# Patient Record
Sex: MALE | Race: BLACK OR AFRICAN AMERICAN | NOT HISPANIC OR LATINO | Employment: PART TIME | URBAN - METROPOLITAN AREA
[De-identification: names, ages, dates, MRNs, and addresses within clinical notes are randomized per-mention and may not be internally consistent; named-entity substitution may affect disease eponyms.]

---

## 2021-05-28 ENCOUNTER — RECORDS - HEALTHEAST (OUTPATIENT)
Dept: ADMINISTRATIVE | Facility: CLINIC | Age: 61
End: 2021-05-28

## 2021-05-31 ENCOUNTER — RECORDS - HEALTHEAST (OUTPATIENT)
Dept: ADMINISTRATIVE | Facility: CLINIC | Age: 61
End: 2021-05-31

## 2021-06-01 ENCOUNTER — RECORDS - HEALTHEAST (OUTPATIENT)
Dept: ADMINISTRATIVE | Facility: CLINIC | Age: 61
End: 2021-06-01

## 2021-11-30 ENCOUNTER — HOSPITAL ENCOUNTER (EMERGENCY)
Facility: CLINIC | Age: 61
Discharge: HOME | End: 2021-11-30
Attending: EMERGENCY MEDICINE | Admitting: EMERGENCY MEDICINE

## 2021-11-30 ENCOUNTER — APPOINTMENT (OUTPATIENT)
Dept: CT IMAGING | Facility: CLINIC | Age: 61
End: 2021-11-30
Attending: EMERGENCY MEDICINE

## 2021-11-30 VITALS
HEIGHT: 69 IN | WEIGHT: 188 LBS | SYSTOLIC BLOOD PRESSURE: 139 MMHG | HEART RATE: 60 BPM | RESPIRATION RATE: 20 BRPM | BODY MASS INDEX: 27.85 KG/M2 | TEMPERATURE: 98 F | OXYGEN SATURATION: 98 % | DIASTOLIC BLOOD PRESSURE: 74 MMHG

## 2021-11-30 DIAGNOSIS — M79.10 MUSCLE PAIN: ICD-10-CM

## 2021-11-30 DIAGNOSIS — R10.9 ACUTE RIGHT FLANK PAIN: ICD-10-CM

## 2021-11-30 LAB
ALBUMIN SERPL-MCNC: 3.1 G/DL (ref 3.5–5)
ALBUMIN UR-MCNC: 10 MG/DL
ALP SERPL-CCNC: 41 U/L (ref 45–120)
ALT SERPL W P-5'-P-CCNC: 38 U/L (ref 0–45)
ANION GAP SERPL CALCULATED.3IONS-SCNC: 6 MMOL/L (ref 5–18)
APPEARANCE UR: CLEAR
AST SERPL W P-5'-P-CCNC: 36 U/L (ref 0–40)
BASOPHILS # BLD AUTO: 0.1 10E3/UL (ref 0–0.2)
BASOPHILS NFR BLD AUTO: 1 %
BILIRUB SERPL-MCNC: 0.7 MG/DL (ref 0–1)
BILIRUB UR QL STRIP: NEGATIVE
BUN SERPL-MCNC: 14 MG/DL (ref 8–22)
CALCIUM SERPL-MCNC: 8.4 MG/DL (ref 8.5–10.5)
CHLORIDE BLD-SCNC: 110 MMOL/L (ref 98–107)
CO2 SERPL-SCNC: 22 MMOL/L (ref 22–31)
COLOR UR AUTO: YELLOW
CREAT SERPL-MCNC: 1.16 MG/DL (ref 0.7–1.3)
EOSINOPHIL # BLD AUTO: 0.1 10E3/UL (ref 0–0.7)
EOSINOPHIL NFR BLD AUTO: 3 %
ERYTHROCYTE [DISTWIDTH] IN BLOOD BY AUTOMATED COUNT: 12.2 % (ref 10–15)
GFR SERPL CREATININE-BSD FRML MDRD: 67 ML/MIN/1.73M2
GLUCOSE BLD-MCNC: 100 MG/DL (ref 70–125)
GLUCOSE UR STRIP-MCNC: NEGATIVE MG/DL
HCT VFR BLD AUTO: 40.1 % (ref 40–53)
HGB BLD-MCNC: 13.8 G/DL (ref 13.3–17.7)
HGB UR QL STRIP: NEGATIVE
IMM GRANULOCYTES # BLD: 0 10E3/UL
IMM GRANULOCYTES NFR BLD: 0 %
INR PPP: 1.9 (ref 0.85–1.15)
KETONES UR STRIP-MCNC: NEGATIVE MG/DL
LEUKOCYTE ESTERASE UR QL STRIP: NEGATIVE
LYMPHOCYTES # BLD AUTO: 1.6 10E3/UL (ref 0.8–5.3)
LYMPHOCYTES NFR BLD AUTO: 43 %
MCH RBC QN AUTO: 32.2 PG (ref 26.5–33)
MCHC RBC AUTO-ENTMCNC: 34.4 G/DL (ref 31.5–36.5)
MCV RBC AUTO: 94 FL (ref 78–100)
MONOCYTES # BLD AUTO: 0.6 10E3/UL (ref 0–1.3)
MONOCYTES NFR BLD AUTO: 16 %
MUCOUS THREADS #/AREA URNS LPF: PRESENT /LPF
NEUTROPHILS # BLD AUTO: 1.4 10E3/UL (ref 1.6–8.3)
NEUTROPHILS NFR BLD AUTO: 37 %
NITRATE UR QL: NEGATIVE
NRBC # BLD AUTO: 0 10E3/UL
NRBC BLD AUTO-RTO: 0 /100
PH UR STRIP: 5.5 [PH] (ref 5–7)
PLATELET # BLD AUTO: 133 10E3/UL (ref 150–450)
POTASSIUM BLD-SCNC: 4 MMOL/L (ref 3.5–5)
PROT SERPL-MCNC: 6.4 G/DL (ref 6–8)
RBC # BLD AUTO: 4.29 10E6/UL (ref 4.4–5.9)
RBC URINE: <1 /HPF
SODIUM SERPL-SCNC: 138 MMOL/L (ref 136–145)
SP GR UR STRIP: 1.03 (ref 1–1.03)
UROBILINOGEN UR STRIP-MCNC: <2 MG/DL
WBC # BLD AUTO: 3.7 10E3/UL (ref 4–11)
WBC URINE: 1 /HPF

## 2021-11-30 PROCEDURE — 85610 PROTHROMBIN TIME: CPT | Performed by: EMERGENCY MEDICINE

## 2021-11-30 PROCEDURE — 81001 URINALYSIS AUTO W/SCOPE: CPT | Performed by: EMERGENCY MEDICINE

## 2021-11-30 PROCEDURE — 250N000013 HC RX MED GY IP 250 OP 250 PS 637: Performed by: EMERGENCY MEDICINE

## 2021-11-30 PROCEDURE — 36415 COLL VENOUS BLD VENIPUNCTURE: CPT | Performed by: EMERGENCY MEDICINE

## 2021-11-30 PROCEDURE — 80053 COMPREHEN METABOLIC PANEL: CPT | Performed by: EMERGENCY MEDICINE

## 2021-11-30 PROCEDURE — 99284 EMERGENCY DEPT VISIT MOD MDM: CPT | Mod: 25

## 2021-11-30 PROCEDURE — 85004 AUTOMATED DIFF WBC COUNT: CPT | Performed by: EMERGENCY MEDICINE

## 2021-11-30 PROCEDURE — 74176 CT ABD & PELVIS W/O CONTRAST: CPT

## 2021-11-30 RX ORDER — CYCLOBENZAPRINE HCL 10 MG
10 TABLET ORAL ONCE
Status: COMPLETED | OUTPATIENT
Start: 2021-11-30 | End: 2021-11-30

## 2021-11-30 RX ORDER — CYCLOBENZAPRINE HCL 10 MG
10 TABLET ORAL 3 TIMES DAILY PRN
Qty: 20 TABLET | Refills: 0 | Status: SHIPPED | OUTPATIENT
Start: 2021-11-30 | End: 2021-12-06

## 2021-11-30 RX ORDER — ACETAMINOPHEN 325 MG/1
975 TABLET ORAL ONCE
Status: COMPLETED | OUTPATIENT
Start: 2021-11-30 | End: 2021-11-30

## 2021-11-30 RX ADMIN — CYCLOBENZAPRINE 10 MG: 10 TABLET, FILM COATED ORAL at 13:55

## 2021-11-30 RX ADMIN — ACETAMINOPHEN 975 MG: 325 TABLET ORAL at 12:20

## 2021-11-30 ASSESSMENT — MIFFLIN-ST. JEOR: SCORE: 1643.14

## 2021-11-30 ASSESSMENT — ENCOUNTER SYMPTOMS
DYSURIA: 0
NAUSEA: 0
FLANK PAIN: 1
COUGH: 0

## 2021-11-30 NOTE — DISCHARGE INSTRUCTIONS
Follow up with your clinic in a few days for ongoing pain to discuss next step in evaluating and treating the cause of that muscular pain.  In the meantime, work on keeping better hydrated.    Use over the counter muscle pain patches that have numbing medicine in them.  Then, use either acetaminophen for pain and the muscle spasm medication as needed for uncontrolled spasms.  Do gentle stretching and walking.    Follow up with your inr clinic.  Your inr today was 1.9 which is likely just below your goal level.

## 2021-11-30 NOTE — ED TRIAGE NOTES
3 day history of right sided flank pain.  Pain is worse with movement and goes away when he sits still.  No known back injury.  Denies dysuria or hematuria.

## 2021-11-30 NOTE — Clinical Note
Robina Ferguson was seen and treated in our emergency department on 11/30/2021.  He may return to work on 12/02/2021.       If you have any questions or concerns, please don't hesitate to call.      Alpa Guzman MD

## 2021-11-30 NOTE — ED PROVIDER NOTES
Emergency Department Encounter     Evaluation Date & Time:   No admission date for patient encounter.    CHIEF COMPLAINT:  Flank Pain      Triage Note:3 day history of right sided flank pain.  Pain is worse with movement and goes away when he sits still.  No known back injury.  Denies dysuria or hematuria.        Impression and Plan     ED COURSE & MEDICAL DECISION MAKIN:05 AM I met with the patient, obtained history, performed an initial exam, and discussed options and plan for diagnostics and treatment here in the ED.   1:29 PM Checked in on and updated patient. I discussed the plan for discharge with the patient, and patient is agreeable.  We discussed supportive cares at home and reasons for return to the ER including new or worsening symptoms - all questions and concerns addressed.  Patient to be discharged by RN.     ED Course as of 21 1340   Tue 2021   1112 Patient's pain seems very much muscular and less likely kidney stone as its made worsened by movement.  I do not really elicit the same level of pain with palpating it but certainly when he flexes forward he does elicit the pain.  Given that he is on warfarin this may be indicative of psoas muscle hemorrhage so will do CT imaging of that area.  I think we can start with unenhanced as I am simply concerned with whether or not there is hemorrhage or fluid whether it is continuing to bleed we could do repeat imaging with contrast if needed.  Patient is comfortable with the plan.  Blood work to check his INR level.  Less likely this is kidney or liver inflammation given the nature of his pain worsened with movement.  I am not able to ski his skin very well out there because he is fully dressed in multiple layers of clothing but really is not sensitive to light touch regardless so less likely to be something in his skin.   1329 Imaging is unremarkable for cause.  He is very slightly dehydrated.  INR is 1.9 so he can follow-up with his  INR clinic on that.  He does feel comfortable going home with treatment for muscle spasm.  He will follow-up with his primary care physician in a few days if he is not improved to discuss further evaluation or therapeutic options       At the conclusion of the encounter I discussed the results of all the tests and the disposition. The questions were answered. The patient or family acknowledged understanding and was agreeable with the care plan.        FINAL IMPRESSION:    ICD-10-CM    1. Muscle pain  M79.10    2. Acute right flank pain  R10.9        0 minutes of critical care time        MEDICATIONS GIVEN IN THE EMERGENCY DEPARTMENT:  Medications   cyclobenzaprine (FLEXERIL) tablet 10 mg (has no administration in time range)   acetaminophen (TYLENOL) tablet 975 mg (975 mg Oral Given 11/30/21 1220)       NEW PRESCRIPTIONS STARTED AT TODAY'S ED VISIT:  New Prescriptions    CYCLOBENZAPRINE (FLEXERIL) 10 MG TABLET    Take 1 tablet (10 mg) by mouth 3 times daily as needed for muscle spasms       HPI     The history is provided by the patient. No  was used.        Robina Ferguson is a 60 year old male with a pertinent history of chronic low back pain and DVT who presents to this ED by walk in for evaluation of right flank pain.     Over the weekend (~2-3 days ago), patient began to feel sharp right flank pain which he describes as kidney pain. Report that it worsens when moving certain ways and there is so pain when applying pressure. Notes that he was barely able to put pants on this morning because bending over caused too much pain. Patient denies acute trauma to his back, but endorse that he spent the weekend in his recliner and that he might have bruised his back.     Yesterday, patient was at work and a teacher gave him a painkiller which did not relieve symptoms. Patient denies having this problem before. Reports history of chronic lower back pain due to a pinched nerve, however, endorses that  "this pain has not been bothering him lately. Patient reports that his normal clinic is [facility]. Patient is on Warfarin and Coumadin for history of DVT. Denies nausea, dysuria, cough, cold, or any other complaints at this time.       REVIEW OF SYSTEMS:  Review of Systems   Respiratory: Negative for cough.    Gastrointestinal: Negative for nausea.   Genitourinary: Positive for flank pain (right). Negative for dysuria.     remainder of systems are all otherwise negative.        Medical History     Past Medical History:   Diagnosis Date     DVT (deep venous thrombosis) (H)        No past surgical history on file.    No family history on file.    Social History     Tobacco Use     Smoking status: Former Smoker     Smokeless tobacco: Not on file     Tobacco comment: Quit at age 26 years.   Substance Use Topics     Alcohol use: Not on file     Drug use: Not on file       cyclobenzaprine (FLEXERIL) 10 MG tablet  warfarin (COUMADIN) 5 MG tablet        Physical Exam     First Vitals:  Patient Vitals for the past 24 hrs:   BP Temp Temp src Pulse Resp SpO2 Height Weight   11/30/21 1300 124/83 -- -- 51 -- 97 % -- --   11/30/21 1230 139/88 -- -- 52 -- 96 % -- --   11/30/21 0907 (!) 123/92 98  F (36.7  C) Oral 62 16 96 % 1.753 m (5' 9\") 85.3 kg (188 lb)       PHYSICAL EXAM:   Constitutional:  Sitting in a wheelchair and conversing normally    HENT:  Normocephalic, wearing a mask and normal voice.   Eyes:  PERRL, EOMI, Conjunctiva normal, No discharge, no scleral icterus.  Respiratory:  Breathing easily, cta  Cardiovascular:  RRR nl s1s2 0 murmurs, rubs, or gallops.  Peripheral pulses dp, pt, and radial are wnl.     GI:  Bowel sounds normal, Soft, No tenderness, No flank tenderness, nondistended.  :No CVA tenderness.   Musculoskeletal:  Moves all extremities.  No erythematous or swollen major joints, mild ttp over r lower posterior thoracic area, but moreso localizes pain there with rom of his back.  Full " rom.  Integument:  Normal color and not diaphoretic.   Lymphatic:  No cervical lymphadenopathy  Neurologic:  Alert & oriented x 3, Normal motor function, Normal sensory function, No focal deficits noted. Normal speech.  Psychiatric:  Affect normal, Judgment normal, Mood normal.     Results     LAB:  All pertinent labs reviewed and interpreted  Results for orders placed or performed during the hospital encounter of 11/30/21   Abd/pelvis CT no contrast - Stone Protocol     Status: None    Narrative    EXAM: CT ABDOMEN PELVIS W/O CONTRAST  LOCATION: [facility]  DATE/TIME: 11/30/2021 11:19 AM    INDICATION: Right flank pain.  COMPARISON: 01/21/2010.  TECHNIQUE: CT scan of the abdomen and pelvis was performed without IV contrast. Multiplanar reformats were obtained. Dose reduction techniques were used.  CONTRAST: None.    FINDINGS:   LOWER CHEST: Normal.    HEPATOBILIARY: Normal.    PANCREAS: Normal.    SPLEEN: Normal.    ADRENAL GLANDS: Normal.    KIDNEYS/BLADDER: No hydronephrosis or renal or ureteral stone on either side. No abnormal renal masses. No bladder stone or mass.    BOWEL: No bowel obstruction. Normal appendix no free intraperitoneal air. No evidence for diverticulitis or abscess.    LYMPH NODES: Normal.    VASCULATURE: Mild aortoiliac calcification without aneurysm.    PELVIC ORGANS: Central prostatic calcification.    MUSCULOSKELETAL: No evidence for intramuscular hematoma in the abdomen or pelvis. Small fat-containing umbilical hernia without evidence for strangulation. Mild degenerative change in the upper lumbar spine no acute fracture or suspicious bony lesion.      Impression    IMPRESSION:   1.  No evidence for hydronephrosis or renal or ureteral stone. No abnormal renal masses.  2.  No muscular hematoma.  3.  No bowel obstruction, appendicitis, diverticulitis or abscess.  4.  Small umbilical hernia containing only fat.     UA with Microscopic reflex to Culture     Status:  Abnormal    Specimen: Urine, Clean Catch   Result Value Ref Range    Color Urine Yellow Colorless, Straw, Light Yellow, Yellow    Appearance Urine Clear Clear    Glucose Urine Negative Negative mg/dL    Bilirubin Urine Negative Negative    Ketones Urine Negative Negative mg/dL    Specific Gravity Urine 1.028 1.001 - 1.030    Blood Urine Negative Negative    pH Urine 5.5 5.0 - 7.0    Protein Albumin Urine 10  (A) Negative mg/dL    Urobilinogen Urine <2.0 <2.0 mg/dL    Nitrite Urine Negative Negative    Leukocyte Esterase Urine Negative Negative    Mucus Urine Present (A) None Seen /LPF    RBC Urine <1 <=2 /HPF    WBC Urine 1 <=5 /HPF    Narrative    Urine Culture not indicated   INR     Status: Abnormal   Result Value Ref Range    INR 1.90 (H) 0.85 - 1.15   Comprehensive metabolic panel     Status: Abnormal   Result Value Ref Range    Sodium 138 136 - 145 mmol/L    Potassium 4.0 3.5 - 5.0 mmol/L    Chloride 110 (H) 98 - 107 mmol/L    Carbon Dioxide (CO2) 22 22 - 31 mmol/L    Anion Gap 6 5 - 18 mmol/L    Urea Nitrogen 14 8 - 22 mg/dL    Creatinine 1.16 0.70 - 1.30 mg/dL    Calcium 8.4 (L) 8.5 - 10.5 mg/dL    Glucose 100 70 - 125 mg/dL    Alkaline Phosphatase 41 (L) 45 - 120 U/L    AST 36 0 - 40 U/L    ALT 38 0 - 45 U/L    Protein Total 6.4 6.0 - 8.0 g/dL    Albumin 3.1 (L) 3.5 - 5.0 g/dL    Bilirubin Total 0.7 0.0 - 1.0 mg/dL    GFR Estimate 67 >60 mL/min/1.73m2   CBC with platelets and differential     Status: Abnormal   Result Value Ref Range    WBC Count 3.7 (L) 4.0 - 11.0 10e3/uL    RBC Count 4.29 (L) 4.40 - 5.90 10e6/uL    Hemoglobin 13.8 13.3 - 17.7 g/dL    Hematocrit 40.1 40.0 - 53.0 %    MCV 94 78 - 100 fL    MCH 32.2 26.5 - 33.0 pg    MCHC 34.4 31.5 - 36.5 g/dL    RDW 12.2 10.0 - 15.0 %    Platelet Count 133 (L) 150 - 450 10e3/uL    % Neutrophils 37 %    % Lymphocytes 43 %    % Monocytes 16 %    % Eosinophils 3 %    % Basophils 1 %    % Immature Granulocytes 0 %    NRBCs per 100 WBC 0 <1 /100    Absolute  Neutrophils 1.4 (L) 1.6 - 8.3 10e3/uL    Absolute Lymphocytes 1.6 0.8 - 5.3 10e3/uL    Absolute Monocytes 0.6 0.0 - 1.3 10e3/uL    Absolute Eosinophils 0.1 0.0 - 0.7 10e3/uL    Absolute Basophils 0.1 0.0 - 0.2 10e3/uL    Absolute Immature Granulocytes 0.0 <=0.4 10e3/uL    Absolute NRBCs 0.0 10e3/uL   CBC with platelets differential     Status: Abnormal    Narrative    The following orders were created for panel order CBC with platelets differential.  Procedure                               Abnormality         Status                     ---------                               -----------         ------                     CBC with platelets and d...[[id number]]  Abnormal            Final result                 Please view results for these tests on the individual orders.       RADIOLOGY:  No results found.    PROCEDURES:  Procedures:      [facility] Documentation       CMS Diagnoses:         The creation of this record is based on the scribe s observations of the work being performed by Alpa Guzman MD, and the provider s statements to them. This document has been checked and approved by MD Alpa Velasquez MD  Emergency Medicine  [facility] EMERGENCY ROOM       Alpa Guzman MD  11/30/21 1537

## 2022-09-02 ENCOUNTER — APPOINTMENT (OUTPATIENT)
Dept: MRI IMAGING | Facility: HOSPITAL | Age: 62
End: 2022-09-02
Attending: EMERGENCY MEDICINE

## 2022-09-02 ENCOUNTER — HOSPITAL ENCOUNTER (EMERGENCY)
Facility: HOSPITAL | Age: 62
Discharge: HOME | End: 2022-09-03
Attending: EMERGENCY MEDICINE | Admitting: EMERGENCY MEDICINE

## 2022-09-02 ENCOUNTER — APPOINTMENT (OUTPATIENT)
Dept: RADIOLOGY | Facility: HOSPITAL | Age: 62
End: 2022-09-02
Attending: EMERGENCY MEDICINE

## 2022-09-02 DIAGNOSIS — Z86.73 CEREBRAL INFARCTION, CHRONIC: ICD-10-CM

## 2022-09-02 DIAGNOSIS — R42 LIGHT HEADEDNESS: ICD-10-CM

## 2022-09-02 DIAGNOSIS — D72.819 LEUKOPENIA, UNSPECIFIED TYPE: ICD-10-CM

## 2022-09-02 DIAGNOSIS — E04.1 THYROID NODULE: ICD-10-CM

## 2022-09-02 DIAGNOSIS — R79.89 ELEVATED SERUM CREATININE: ICD-10-CM

## 2022-09-02 LAB
ANION GAP SERPL CALCULATED.3IONS-SCNC: 9 MMOL/L (ref 5–18)
BUN SERPL-MCNC: 16 MG/DL (ref 8–22)
CALCIUM SERPL-MCNC: 8.3 MG/DL (ref 8.5–10.5)
CHLORIDE BLD-SCNC: 107 MMOL/L (ref 98–107)
CO2 SERPL-SCNC: 22 MMOL/L (ref 22–31)
CREAT SERPL-MCNC: 1.41 MG/DL (ref 0.7–1.3)
ERYTHROCYTE [DISTWIDTH] IN BLOOD BY AUTOMATED COUNT: 11.9 % (ref 10–15)
GFR SERPL CREATININE-BSD FRML MDRD: 56 ML/MIN/1.73M2
GLUCOSE BLD-MCNC: 164 MG/DL (ref 70–125)
HCT VFR BLD AUTO: 40.7 % (ref 40–53)
HGB BLD-MCNC: 14.7 G/DL (ref 13.3–17.7)
HOLD SPECIMEN: NORMAL
INR PPP: 2.08 (ref 0.85–1.15)
MCH RBC QN AUTO: 33.6 PG (ref 26.5–33)
MCHC RBC AUTO-ENTMCNC: 36.1 G/DL (ref 31.5–36.5)
MCV RBC AUTO: 93 FL (ref 78–100)
PLATELET # BLD AUTO: 150 10E3/UL (ref 150–450)
POTASSIUM BLD-SCNC: 3.5 MMOL/L (ref 3.5–5)
RBC # BLD AUTO: 4.38 10E6/UL (ref 4.4–5.9)
SODIUM SERPL-SCNC: 138 MMOL/L (ref 136–145)
TROPONIN I SERPL-MCNC: 0.01 NG/ML (ref 0–0.29)
WBC # BLD AUTO: 3.5 10E3/UL (ref 4–11)

## 2022-09-02 PROCEDURE — 36415 COLL VENOUS BLD VENIPUNCTURE: CPT | Performed by: EMERGENCY MEDICINE

## 2022-09-02 PROCEDURE — 85610 PROTHROMBIN TIME: CPT | Performed by: EMERGENCY MEDICINE

## 2022-09-02 PROCEDURE — 70553 MRI BRAIN STEM W/O & W/DYE: CPT

## 2022-09-02 PROCEDURE — 99285 EMERGENCY DEPT VISIT HI MDM: CPT | Mod: 25

## 2022-09-02 PROCEDURE — 85027 COMPLETE CBC AUTOMATED: CPT | Performed by: EMERGENCY MEDICINE

## 2022-09-02 PROCEDURE — 80048 BASIC METABOLIC PNL TOTAL CA: CPT | Performed by: EMERGENCY MEDICINE

## 2022-09-02 PROCEDURE — 71046 X-RAY EXAM CHEST 2 VIEWS: CPT

## 2022-09-02 PROCEDURE — A9585 GADOBUTROL INJECTION: HCPCS | Performed by: EMERGENCY MEDICINE

## 2022-09-02 PROCEDURE — 84484 ASSAY OF TROPONIN QUANT: CPT | Performed by: EMERGENCY MEDICINE

## 2022-09-02 PROCEDURE — 93005 ELECTROCARDIOGRAM TRACING: CPT | Performed by: EMERGENCY MEDICINE

## 2022-09-02 PROCEDURE — 70549 MR ANGIOGRAPH NECK W/O&W/DYE: CPT

## 2022-09-02 PROCEDURE — 70544 MR ANGIOGRAPHY HEAD W/O DYE: CPT

## 2022-09-02 PROCEDURE — 255N000002 HC RX 255 OP 636: Performed by: EMERGENCY MEDICINE

## 2022-09-02 RX ORDER — GADOBUTROL 604.72 MG/ML
9 INJECTION INTRAVENOUS ONCE
Status: COMPLETED | OUTPATIENT
Start: 2022-09-02 | End: 2022-09-02

## 2022-09-02 RX ADMIN — GADOBUTROL 9 ML: 604.72 INJECTION INTRAVENOUS at 22:42

## 2022-09-02 ASSESSMENT — ENCOUNTER SYMPTOMS
PALPITATIONS: 0
HEADACHES: 0
FATIGUE: 1
FEVER: 0
NECK PAIN: 0
DIARRHEA: 0
LIGHT-HEADEDNESS: 1
TROUBLE SWALLOWING: 0
NAUSEA: 0
VOMITING: 0

## 2022-09-02 ASSESSMENT — ACTIVITIES OF DAILY LIVING (ADL)
ADLS_ACUITY_SCORE: 35
ADLS_ACUITY_SCORE: 35

## 2022-09-03 VITALS
RESPIRATION RATE: 11 BRPM | BODY MASS INDEX: 27.7 KG/M2 | DIASTOLIC BLOOD PRESSURE: 79 MMHG | SYSTOLIC BLOOD PRESSURE: 125 MMHG | OXYGEN SATURATION: 98 % | TEMPERATURE: 98 F | WEIGHT: 187 LBS | HEART RATE: 53 BPM | HEIGHT: 69 IN

## 2022-09-03 ASSESSMENT — ENCOUNTER SYMPTOMS
SHORTNESS OF BREATH: 0
WOUND: 1
CONFUSION: 0

## 2022-09-03 NOTE — ED TRIAGE NOTES
"Pt reports 2-3 days ago he started having issues with his vision while he was driving, pt states \" I could see but I couldn't focus\", pt also reports some dizziness. Pt denies any headache or pain.  Pt states he feels tired but not sleepy.  Pt reports he and wife shun came back from a road trip to [place]     Triage Assessment     Row Name 09/02/22 1952       Triage Assessment (Adult)    Airway WDL WDL       Respiratory WDL    Respiratory WDL WDL       Skin Circulation/Temperature WDL    Skin Circulation/Temperature WDL WDL       Cardiac WDL    Cardiac WDL WDL       Peripheral/Neurovascular WDL    Peripheral Neurovascular WDL WDL       Cognitive/Neuro/Behavioral WDL    Cognitive/Neuro/Behavioral WDL WDL              "

## 2022-09-03 NOTE — ED PROVIDER NOTES
EMERGENCY DEPARTMENT ENCOUNTER      NAME: Robina Ferguson  AGE: 61 year old male  YOB: 1960  MRN: [id number]  EVALUATION DATE & TIME: 9/2/2022  7:56 PM    PCP: No Ref-Primary, Physician    ED PROVIDER: Juan Balderas MD        Chief Complaint   Patient presents with     Dizziness     Loss of Vision         FINAL IMPRESSION:  1. Cerebral infarction, chronic    2. Light headedness    3. Thyroid nodule    4. Elevated serum creatinine    5. Leukopenia, unspecified type          ED COURSE & MEDICAL DECISION MAKING:    Pertinent Labs & Imaging studies reviewed. (See chart for details)  61 year old male presents to the Emergency Department for evaluation of lightheadedness.    8:09 PM Initial history and physical performed. Plan of care discussed. PPE utilized includes N95 mask, face shield, and gloves.  11:57 PM I rechecked and updated the patient.      ED Course as of 09/03/22 0004   Fri Sep 02, 2022   2022 Patient presents with what sounds like intermittent lightheadedness and blurred vision.  Was seen in eye clinic earlier today and says his eyes were fine per the eye doctor he saw.   2022 Currently has no symptoms.  He has no chest pain or shortness of breath.  He is on warfarin chronically for DVT   2022 No chest pain or shortness of breath or tachycardia to suggest PE   2022 No temporal artery tenderness and would not expect temporal arteritis to cause lightheadedness   2023 Broad differential including TIA, stroke, arrhythmia, anemia, electrolyte normality   2023 Plan for EKG, chest x-ray, labs, MR imaging of brain and neck   2347 WBC(!): 3.5  Previously had leukopenia as well   2348 Creatinine(!): 1.41  Increased creatinine from baseline   2348 INR(!): 2.08  Therapeutic therefore pulmonary emboli unlikely       Work-up shows chronic infarcts, thyroid nodule, elevated creatinine, leukopenia, hypocalcemia    Discussed starting baby aspirin and follow-up with primary care doctor    At the conclusion of  "the encounter I discussed the results of all of the tests and the disposition. The questions were answered. The patient or family acknowledged understanding and was agreeable with the care plan.         MEDICATIONS GIVEN IN THE EMERGENCY:  Medications   gadobutrol (GADAVIST) injection 9 mL (9 mLs Intravenous Given 9/2/22 2242)       NEW PRESCRIPTIONS STARTED AT TODAY'S ER VISIT  New Prescriptions    No medications on file          =================================================================    HPI    Triage note  \"  Pt reports 2-3 days ago he started having issues with his vision while he was driving, pt states \" I could see but I couldn't focus\", pt also reports some dizziness. Pt denies any headache or pain.  Pt states he feels tired but not sleepy.  Pt reports he and wife shun came back from a road trip to [place]     Triage Assessment     Row Name 09/02/22 1952       Triage Assessment (Adult)    Airway WDL WDL       Respiratory WDL    Respiratory WDL WDL       Skin Circulation/Temperature WDL    Skin Circulation/Temperature WDL WDL       Cardiac WDL    Cardiac WDL WDL       Peripheral/Neurovascular WDL    Peripheral Neurovascular WDL WDL       Cognitive/Neuro/Behavioral WDL    Cognitive/Neuro/Behavioral WDL WDL              \"      Patient information was obtained from: Patient    Use of : N/A       Robina Ferguson is a 61 year old male with a 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    Patient reports that he was driving back from [place] 2-3 days ago when he began to feel like he was not able to control the car. Patient says that he felt like he could not focus his eyes and his depth perception was altered. Patient notes that he was also anxious and thinks that may have had something to do with it. While at home the patient reports that he became lightheaded while he was standing and decided to be seen. " "The patient initially reported to an optometrist who told him that everything with his eyes was normal and okay and that he should be seen in the ED. Patient endorses fatigue and mild left ear pain that resolved on its own quickly. Patient denies headache, neck pain, tinnitus, hearing loss, trouble swallowing, palpitations, nausea, vomiting, diarrhea, fevers, or any other concerns at this time.    REVIEW OF SYSTEMS   Review of Systems   Constitutional: Positive for fatigue. Negative for fever.   HENT: Positive for ear pain. Negative for hearing loss, tinnitus and trouble swallowing.    Eyes: Positive for visual disturbance.   Respiratory: Negative for shortness of breath.    Cardiovascular: Negative for palpitations.   Gastrointestinal: Negative for diarrhea, nausea and vomiting.   Musculoskeletal: Negative for neck pain.   Skin: Positive for wound.   Neurological: Positive for light-headedness. Negative for headaches.   Psychiatric/Behavioral: Negative for confusion.   All other systems reviewed and are negative.    PAST MEDICAL HISTORY:  Past Medical History:   Diagnosis Date     DVT (deep venous thrombosis) (H)        PAST SURGICAL HISTORY:  No past surgical history on file.        CURRENT MEDICATIONS:    warfarin (COUMADIN) 5 MG tablet        ALLERGIES:  Allergies   Allergen Reactions     Penicillins Rash       FAMILY HISTORY:  No family history on file.    SOCIAL HISTORY:   Social History     Socioeconomic History     Marital status:    Tobacco Use     Smoking status: Former Smoker     Tobacco comment: Quit at age 26 years.       VITALS:  /79   Pulse 59   Temp 98  F (36.7  C) (Temporal)   Resp 11   Ht 1.753 m (5' 9\")   Wt 84.8 kg (187 lb)   SpO2 98%   BMI 27.62 kg/m      PHYSICAL EXAM      Vitals: /79   Pulse 59   Temp 98  F (36.7  C) (Temporal)   Resp 11   Ht 1.753 m (5' 9\")   Wt 84.8 kg (187 lb)   SpO2 98%   BMI 27.62 kg/m    General: Appears in no acute distress, awake, " alert, interactive.  Eyes: Conjunctivae non-injected. Sclera anicteric.  HENT: Atraumatic. No temporal artery tenderness. PERRL. Normal occular movement.  Neck: Supple.  Respiratory/Chest: Respiration unlabored. No murmur. No wheezing.  Abdomen: non distended  Musculoskeletal: Normal extremities. No edema or erythema.  Skin: Normal color. No rash or diaphoresis.  Neurologic: Face symmetric, moves all extremities spontaneously. Speech clear.  Psychiatric: Oriented to person, place, and time. Affect appropriate.       LAB:  All pertinent labs reviewed and interpreted.  Results for orders placed or performed during the hospital encounter of 09/02/22   MR Brain w/o & w Contrast    Impression    IMPRESSION:  1.  No acute infarct.  2.  Chronic intracranial changes described above.   MRA Brain ([place] [facility] [place]) wo Contrast    Impression    IMPRESSION:  1.  No significant stenosis/occlusion.   MRA Neck (Carotids) wo & w Contrast    Impression    IMPRESSION:  1.  Left vertebral artery origin and left V1 segment suboptimally evaluated.   2.  Otherwise, no significant stenosis, occlusion, dissection.  3.  Right thyroid lobe nodule described above. Recommend nonemergent thyroid ultrasound.   Chest XR,  PA & LAT    Impression    IMPRESSION: No evidence of active cardiopulmonary disease.    Extra Blue Top Tube   Result Value Ref Range    Hold Specimen JIC    Extra Red Top Tube   Result Value Ref Range    Hold Specimen JIC    Extra Green Top (Lithium Heparin) Tube   Result Value Ref Range    Hold Specimen JIC    Extra Purple Top Tube   Result Value Ref Range    Hold Specimen JIC    Extra Green Top (Lithium Heparin) ON ICE   Result Value Ref Range    Hold Specimen JIC    CBC (+ platelets, no diff)   Result Value Ref Range    WBC Count 3.5 (L) 4.0 - 11.0 10e3/uL    RBC Count 4.38 (L) 4.40 - 5.90 10e6/uL    Hemoglobin 14.7 13.3 - 17.7 g/dL    Hematocrit 40.7 40.0 - 53.0 %    MCV 93 78 - 100 fL    MCH 33.6 (H) 26.5 - 33.0 pg    MCHC  36.1 31.5 - 36.5 g/dL    RDW 11.9 10.0 - 15.0 %    Platelet Count 150 150 - 450 10e3/uL   Result Value Ref Range    INR 2.08 (H) 0.85 - 1.15   Basic metabolic panel   Result Value Ref Range    Sodium 138 136 - 145 mmol/L    Potassium 3.5 3.5 - 5.0 mmol/L    Chloride 107 98 - 107 mmol/L    Carbon Dioxide (CO2) 22 22 - 31 mmol/L    Anion Gap 9 5 - 18 mmol/L    Urea Nitrogen 16 8 - 22 mg/dL    Creatinine 1.41 (H) 0.70 - 1.30 mg/dL    Calcium 8.3 (L) 8.5 - 10.5 mg/dL    Glucose 164 (H) 70 - 125 mg/dL    GFR Estimate 56 (L) >60 mL/min/1.73m2   Troponin I (now)   Result Value Ref Range    Troponin I 0.01 0.00 - 0.29 ng/mL       RADIOLOGY:  Reviewed all pertinent imaging. Please see official radiology report.  MR Brain w/o & w Contrast   Final Result   IMPRESSION:   1.  No acute infarct.   2.  Chronic intracranial changes described above.      MRA Neck (Carotids) wo & w Contrast   Final Result   IMPRESSION:   1.  Left vertebral artery origin and left V1 segment suboptimally evaluated.    2.  Otherwise, no significant stenosis, occlusion, dissection.   3.  Right thyroid lobe nodule described above. Recommend nonemergent thyroid ultrasound.      MRA Brain ([place] [facility] [place]) wo Contrast   Final Result   IMPRESSION:   1.  No significant stenosis/occlusion.      Chest XR,  PA & LAT   Final Result   IMPRESSION: No evidence of active cardiopulmonary disease.           EKG:    Performed at: 02-Sep-2022 20:33:34    Impression: Normal ECG    Rate: 63 bpm  Rhythm: Sinus rhythm  Axis: 35  NE Interval: 204 ms  QRS Interval: 86 ms  QTc Interval: 392 ms  ST Changes: N/A  Comparison: When compared with ECG f 13-Dec-2004 16:21. Inverted T waves have replaced nonspecific T wave abnormality in Inferior leads, Nonspecific T wave abnormality now evident in Lateral leads.    I have independently reviewed and interpreted the EKG(s) documented above.        Enmanuel MCCULLOUGH, am serving as a scribe to document services personally performed by  Derick Balderas MD based on my observation and the provider's statements to me. I, Dr. Derick Balderas, attest that Enmanuel Penn is acting in a scribe capacity, has observed my performance of the services and has documented them in accordance with my direction.    Derick Balderas MD  Emergency Medicine  [facility] EMERGENCY DEPARTMENT  [address] [place] [id number]  [phone]       Derick Balderas MD  09/03/22 0004

## 2022-09-03 NOTE — DISCHARGE INSTRUCTIONS
Recommend starting the baby aspirin and following up with your primary care doctor for recheck and risk factor modification to prevent future strokes.  Recommend a outpatient thyroid ultrasound to evaluate a right-sided thyroid nodule.  Return to the ER for worsening symptoms

## 2022-09-05 LAB
ATRIAL RATE - MUSE: 63 BPM
DIASTOLIC BLOOD PRESSURE - MUSE: NORMAL MMHG
INTERPRETATION ECG - MUSE: NORMAL
P AXIS - MUSE: 46 DEGREES
PR INTERVAL - MUSE: 204 MS
QRS DURATION - MUSE: 86 MS
QT - MUSE: 384 MS
QTC - MUSE: 392 MS
R AXIS - MUSE: 35 DEGREES
SYSTOLIC BLOOD PRESSURE - MUSE: NORMAL MMHG
T AXIS - MUSE: 6 DEGREES
VENTRICULAR RATE- MUSE: 63 BPM

## 2022-09-20 ENCOUNTER — HOSPITAL ENCOUNTER (EMERGENCY)
Facility: CLINIC | Age: 62
Discharge: HOME | End: 2022-09-20

## 2022-09-20 VITALS
DIASTOLIC BLOOD PRESSURE: 99 MMHG | TEMPERATURE: 97.7 F | BODY MASS INDEX: 28.71 KG/M2 | RESPIRATION RATE: 16 BRPM | SYSTOLIC BLOOD PRESSURE: 146 MMHG | HEART RATE: 68 BPM | OXYGEN SATURATION: 96 % | WEIGHT: 194.45 LBS

## 2022-09-21 NOTE — ED TRIAGE NOTES
"Pt presents to the ED with c/o some disorientation that has been going on for a few weeks. Pt stated multiple times the best way to describe it is that he has a \"lack of efficacy\". Pt states troubles driving, feeling like he has to get off of the road when a car goes by him fast. Pt has hx of cerebral infarction. Equal  strength, no facial drooping. No neuro deficits identified. Denies any troubles ambulating. Hx of DVT in RLE. Takes coumadin.      Triage Assessment     Row Name 09/20/22 8140       Triage Assessment (Adult)    Airway WDL WDL       Respiratory WDL    Respiratory WDL WDL       Skin Circulation/Temperature WDL    Skin Circulation/Temperature WDL WDL       Cardiac WDL    Cardiac WDL WDL       Peripheral/Neurovascular WDL    Peripheral Neurovascular WDL WDL       Cognitive/Neuro/Behavioral WDL    Cognitive/Neuro/Behavioral WDL WDL       Darby Coma Scale    Best Eye Response 4-->(E4) spontaneous    Best Motor Response 6-->(M6) obeys commands    Best Verbal Response 5-->(V5) oriented    [place] Coma Scale Score 15              "

## 2024-03-07 ENCOUNTER — HOSPITAL ENCOUNTER (EMERGENCY)
Facility: CLINIC | Age: 64
Discharge: HOME | End: 2024-03-08
Attending: STUDENT IN AN ORGANIZED HEALTH CARE EDUCATION/TRAINING PROGRAM | Admitting: STUDENT IN AN ORGANIZED HEALTH CARE EDUCATION/TRAINING PROGRAM
Payer: MEDICARE

## 2024-03-07 ENCOUNTER — APPOINTMENT (OUTPATIENT)
Dept: CT IMAGING | Facility: CLINIC | Age: 64
End: 2024-03-07
Attending: STUDENT IN AN ORGANIZED HEALTH CARE EDUCATION/TRAINING PROGRAM
Payer: MEDICARE

## 2024-03-07 ENCOUNTER — APPOINTMENT (OUTPATIENT)
Dept: MRI IMAGING | Facility: CLINIC | Age: 64
End: 2024-03-07
Attending: STUDENT IN AN ORGANIZED HEALTH CARE EDUCATION/TRAINING PROGRAM
Payer: MEDICARE

## 2024-03-07 DIAGNOSIS — R42 DISEQUILIBRIUM: ICD-10-CM

## 2024-03-07 LAB
ANION GAP SERPL CALCULATED.3IONS-SCNC: 8 MMOL/L (ref 7–15)
BASOPHILS # BLD AUTO: 0.1 10E3/UL (ref 0–0.2)
BASOPHILS NFR BLD AUTO: 1 %
BUN SERPL-MCNC: 20.6 MG/DL (ref 8–23)
CALCIUM SERPL-MCNC: 8.8 MG/DL (ref 8.8–10.2)
CHLORIDE SERPL-SCNC: 104 MMOL/L (ref 98–107)
CREAT SERPL-MCNC: 1.52 MG/DL (ref 0.67–1.17)
DEPRECATED HCO3 PLAS-SCNC: 25 MMOL/L (ref 22–29)
EGFRCR SERPLBLD CKD-EPI 2021: 51 ML/MIN/1.73M2
EOSINOPHIL # BLD AUTO: 0.1 10E3/UL (ref 0–0.7)
EOSINOPHIL NFR BLD AUTO: 2 %
ERYTHROCYTE [DISTWIDTH] IN BLOOD BY AUTOMATED COUNT: 11.9 % (ref 10–15)
GLUCOSE SERPL-MCNC: 97 MG/DL (ref 70–99)
HCT VFR BLD AUTO: 40.3 % (ref 40–53)
HGB BLD-MCNC: 14.3 G/DL (ref 13.3–17.7)
IMM GRANULOCYTES # BLD: 0 10E3/UL
IMM GRANULOCYTES NFR BLD: 0 %
LYMPHOCYTES # BLD AUTO: 1.7 10E3/UL (ref 0–5.3)
LYMPHOCYTES NFR BLD AUTO: 48 %
MCH RBC QN AUTO: 32.6 PG (ref 26.5–33)
MCHC RBC AUTO-ENTMCNC: 35.5 G/DL (ref 31.5–36.5)
MCV RBC AUTO: 92 FL (ref 78–100)
MONOCYTES # BLD AUTO: 0.4 10E3/UL (ref 0–1.3)
MONOCYTES NFR BLD AUTO: 12 %
NEUTROPHILS # BLD AUTO: 1.3 10E3/UL (ref 1.6–8.3)
NEUTROPHILS NFR BLD AUTO: 37 %
NRBC # BLD AUTO: 0 10E3/UL
NRBC BLD AUTO-RTO: 0 /100
PLATELET # BLD AUTO: 135 10E3/UL (ref 150–450)
POTASSIUM SERPL-SCNC: 4 MMOL/L (ref 3.4–5.3)
RBC # BLD AUTO: 4.39 10E6/UL (ref 4.4–5.9)
SODIUM SERPL-SCNC: 137 MMOL/L (ref 135–145)
WBC # BLD AUTO: 3.5 10E3/UL (ref 4–11)

## 2024-03-07 PROCEDURE — A9585 GADOBUTROL INJECTION: HCPCS | Performed by: STUDENT IN AN ORGANIZED HEALTH CARE EDUCATION/TRAINING PROGRAM

## 2024-03-07 PROCEDURE — 250N000011 HC RX IP 250 OP 636: Performed by: STUDENT IN AN ORGANIZED HEALTH CARE EDUCATION/TRAINING PROGRAM

## 2024-03-07 PROCEDURE — 250N000013 HC RX MED GY IP 250 OP 250 PS 637: Performed by: STUDENT IN AN ORGANIZED HEALTH CARE EDUCATION/TRAINING PROGRAM

## 2024-03-07 PROCEDURE — 70553 MRI BRAIN STEM W/O & W/DYE: CPT | Mod: MA

## 2024-03-07 PROCEDURE — 70496 CT ANGIOGRAPHY HEAD: CPT | Mod: MA

## 2024-03-07 PROCEDURE — 80048 BASIC METABOLIC PNL TOTAL CA: CPT | Performed by: STUDENT IN AN ORGANIZED HEALTH CARE EDUCATION/TRAINING PROGRAM

## 2024-03-07 PROCEDURE — 36415 COLL VENOUS BLD VENIPUNCTURE: CPT | Performed by: STUDENT IN AN ORGANIZED HEALTH CARE EDUCATION/TRAINING PROGRAM

## 2024-03-07 PROCEDURE — 255N000002 HC RX 255 OP 636: Performed by: STUDENT IN AN ORGANIZED HEALTH CARE EDUCATION/TRAINING PROGRAM

## 2024-03-07 PROCEDURE — 99285 EMERGENCY DEPT VISIT HI MDM: CPT | Mod: 25

## 2024-03-07 PROCEDURE — 85025 COMPLETE CBC W/AUTO DIFF WBC: CPT | Performed by: STUDENT IN AN ORGANIZED HEALTH CARE EDUCATION/TRAINING PROGRAM

## 2024-03-07 RX ORDER — MECLIZINE HYDROCHLORIDE 25 MG/1
25 TABLET ORAL ONCE
Status: COMPLETED | OUTPATIENT
Start: 2024-03-07 | End: 2024-03-07

## 2024-03-07 RX ORDER — GADOBUTROL 604.72 MG/ML
0.1 INJECTION INTRAVENOUS ONCE
Status: DISCONTINUED | OUTPATIENT
Start: 2024-03-07 | End: 2024-03-07

## 2024-03-07 RX ORDER — IOPAMIDOL 755 MG/ML
75 INJECTION, SOLUTION INTRAVASCULAR ONCE
Status: COMPLETED | OUTPATIENT
Start: 2024-03-07 | End: 2024-03-07

## 2024-03-07 RX ADMIN — GADOBUTROL 8.5 ML: 604.72 INJECTION INTRAVENOUS at 22:45

## 2024-03-07 RX ADMIN — MECLIZINE HYDROCHLORIDE 25 MG: 25 TABLET ORAL at 21:38

## 2024-03-07 RX ADMIN — IOPAMIDOL 75 ML: 755 INJECTION, SOLUTION INTRAVENOUS at 22:14

## 2024-03-07 ASSESSMENT — ACTIVITIES OF DAILY LIVING (ADL)
ADLS_ACUITY_SCORE: 35
ADLS_ACUITY_SCORE: 35

## 2024-03-08 VITALS
SYSTOLIC BLOOD PRESSURE: 134 MMHG | OXYGEN SATURATION: 98 % | RESPIRATION RATE: 14 BRPM | DIASTOLIC BLOOD PRESSURE: 84 MMHG | TEMPERATURE: 97 F | HEART RATE: 51 BPM

## 2024-03-08 NOTE — ED PROVIDER NOTES
"EMERGENCY DEPARTMENT ENCOUNTER      NAME: Robina Ferguson  AGE: 63 year old male  YOB: 1960  MRN: [id number]  EVALUATION DATE & TIME: No admission date for patient encounter.    PCP: No Ref-Primary, Physician    ED PROVIDER: Telly Sow MD      Chief Complaint   Patient presents with    Dizziness    Anxiety    Aphasia         FINAL IMPRESSION:  1. Disequilibrium          ED COURSE & MEDICAL DECISION MAKING:    Pertinent Labs & Imaging studies reviewed. (See chart for details)  63 year old male presents to the Emergency Department for evaluation of disequilibrium, word finding difficulties    ED Course as of 03/08/24 0008   Thu Mar 07, 2024   2128 Patient is a 63-year-old male who presents the emergency department with speech finding difficulties, feeling \"off balance\", and \"brain fog\".  He has had similar symptoms several months ago, and states that it was from earwax in his ear.  He has no occluding cerumen on exam.  His last known well was 2 days ago, so code stroke not activated.  His NIH stroke scale is 0.  He has no word finding difficulty for me on exam, but states that he has been having this intermittently for the past 2 days where he cannot say [place] and [place].  He states them clearly for me now.  He has no abnormalities with finger-to-nose or heel-to-shin, and has no gait difficulties.  Will proceed with stroke workup, and provide meclizine for his perceived balance issues.   2156 No leukocytosis or anemia.   2222 No electrolyte abnormalities.  No change in CKD.   2245 CTA head and neck unremarkable, specifically no vessel occlusion or dissection or aneurysm.   Fri Mar 08, 2024   0003 Discussed with stroke neuro, with normal brain MRI (except chronic infarcts), and him being on Warfarin, does not need further work up. Unlikely to be sxs caused by acute stroke. Will have patient follow up outpatient.       Medical Decision Making    History:  Supplemental history from: Documented " "in chart  External Record(s) reviewed: Documented in chart    Work Up:  Chart documentation includes differential considered and any EKGs or imaging independently interpreted by provider, where specified.  In additional to work up documented, I considered the following work up: Documented in chart, if applicable.    External consultation:  Discussion of management with another provider: Documented in chart, if applicable    Complicating factors:  Care impacted by chronic illness: N/A  Care affected by social determinants of health: N/A    Disposition considerations: Discharge. No recommendations on prescription strength medication(s). See documentation for any additional details.        At the conclusion of the encounter I discussed the results of all of the tests and the disposition. The questions were answered. The patient or family acknowledged understanding and was agreeable with the care plan.     0 minutes of critical care time     MEDICATIONS GIVEN IN THE EMERGENCY:  Medications   meclizine (ANTIVERT) tablet 25 mg (25 mg Oral $Given 3/7/24 2138)   iopamidol (ISOVUE-370) solution 75 mL (75 mLs Intravenous $Given 3/7/24 2214)       NEW PRESCRIPTIONS STARTED AT TODAY'S ER VISIT  New Prescriptions    No medications on file          =================================================================    HPI    Patient information was obtained from: patient    Use of : N/A        Robina Ferguson is a 63 year old male who presents to this ED for evaluation of disequilibrium, word finding difficulties.  The patient states that 2 days ago he was having difficulty with saying the words [place] and [place], as he is learning [place].  He is currently able to state them without difficulty.  He states that he was also having feelings of \"off balance\" while driving 1 hour ago.  He states he had similar symptoms previously when he had earwax occluding his ear, and denies changes in hearing or ear pain.  No recent " head trauma or illness.  No numbness/weakness on one side of his body.  The patient feels he is able to articulate speech well, and denies word finding difficulties at this time.  No changes in vision.  No double vision.      PAST MEDICAL HISTORY:  Past Medical History:   Diagnosis Date    DVT (deep venous thrombosis) (H)        PAST SURGICAL HISTORY:  No past surgical history on file.        CURRENT MEDICATIONS:    warfarin (COUMADIN) 5 MG tablet        ALLERGIES:  Allergies   Allergen Reactions    Penicillins Rash       FAMILY HISTORY:  No family history on file.    SOCIAL HISTORY:   Social History     Socioeconomic History    Marital status:    Tobacco Use    Smoking status: Former    Tobacco comments:     Quit at age 26 years.       VITALS:  BP (!) 145/85   Pulse (!) 49   Temp 97  F (36.1  C)   Resp 14   SpO2 97%     PHYSICAL EXAM    Physical Exam  Vitals and nursing note reviewed.   Constitutional:       General: He is not in acute distress.     Appearance: Normal appearance. He is normal weight. He is not ill-appearing.   HENT:      Head: Normocephalic and atraumatic.      Right Ear: Tympanic membrane, ear canal and external ear normal.      Left Ear: Tympanic membrane, ear canal and external ear normal.      Nose: Nose normal.      Mouth/Throat:      Mouth: Mucous membranes are moist.      Pharynx: Oropharynx is clear.   Eyes:      Extraocular Movements: Extraocular movements intact.      Conjunctiva/sclera: Conjunctivae normal.      Pupils: Pupils are equal, round, and reactive to light.   Cardiovascular:      Rate and Rhythm: Normal rate and regular rhythm.      Pulses: Normal pulses.   Pulmonary:      Effort: Pulmonary effort is normal.   Abdominal:      General: Abdomen is flat. There is no distension.      Palpations: Abdomen is soft.      Tenderness: There is no abdominal tenderness.   Musculoskeletal:         General: Normal range of motion.      Cervical back: Normal range of motion and  neck supple. No rigidity.      Right lower leg: No edema.      Left lower leg: No edema.   Skin:     General: Skin is warm and dry.      Capillary Refill: Capillary refill takes less than 2 seconds.   Neurological:      General: No focal deficit present.      Mental Status: He is alert and oriented to person, place, and time. Mental status is at baseline.      Cranial Nerves: No cranial nerve deficit.      Sensory: No sensory deficit.      Motor: No weakness.      Coordination: Coordination normal.      Gait: Gait normal.      Comments: NIH stroke scale 0   Psychiatric:         Mood and Affect: Mood normal.         Behavior: Behavior normal.         Thought Content: Thought content normal.         Judgment: Judgment normal.            LAB:  All pertinent labs reviewed and interpreted.  Results for orders placed or performed during the hospital encounter of 03/07/24   CTA Head Neck with Contrast    Impression    IMPRESSION:   HEAD CT:  No acute intracranial process.    HEAD CTA:  1.  No stenosis/occlusion, aneurysm, or high flow vascular malformation.  2.  Variant [place] of [place] anatomy as above.    NECK CTA:  No measurable stenosis or dissection.     MR Brain w/o & w Contrast    Impression    IMPRESSION:  1.  No acute intracranial process.  2.  Stable chronic changes as detailed above.   Basic metabolic panel   Result Value Ref Range    Sodium 137 135 - 145 mmol/L    Potassium 4.0 3.4 - 5.3 mmol/L    Chloride 104 98 - 107 mmol/L    Carbon Dioxide (CO2) 25 22 - 29 mmol/L    Anion Gap 8 7 - 15 mmol/L    Urea Nitrogen 20.6 8.0 - 23.0 mg/dL    Creatinine 1.52 (H) 0.67 - 1.17 mg/dL    GFR Estimate 51 (L) >60 mL/min/1.73m2    Calcium 8.8 8.8 - 10.2 mg/dL    Glucose 97 70 - 99 mg/dL   CBC with platelets and differential   Result Value Ref Range    WBC Count 3.5 (L) 4.0 - 11.0 10e3/uL    RBC Count 4.39 (L) 4.40 - 5.90 10e6/uL    Hemoglobin 14.3 13.3 - 17.7 g/dL    Hematocrit 40.3 40.0 - 53.0 %    MCV 92 78 - 100 fL    MCH  32.6 26.5 - 33.0 pg    MCHC 35.5 31.5 - 36.5 g/dL    RDW 11.9 10.0 - 15.0 %    Platelet Count 135 (L) 150 - 450 10e3/uL    % Neutrophils 37 %    % Lymphocytes 48 %    % Monocytes 12 %    % Eosinophils 2 %    % Basophils 1 %    % Immature Granulocytes 0 %    NRBCs per 100 WBC 0 <1 /100    Absolute Neutrophils 1.3 (L) 1.6 - 8.3 10e3/uL    Absolute Lymphocytes 1.7 0.0 - 5.3 10e3/uL    Absolute Monocytes 0.4 0.0 - 1.3 10e3/uL    Absolute Eosinophils 0.1 0.0 - 0.7 10e3/uL    Absolute Basophils 0.1 0.0 - 0.2 10e3/uL    Absolute Immature Granulocytes 0.0 <=0.4 10e3/uL    Absolute NRBCs 0.0 10e3/uL       RADIOLOGY:  Reviewed all pertinent imaging. Please see official radiology report.  MR Brain w/o & w Contrast   Final Result   IMPRESSION:   1.  No acute intracranial process.   2.  Stable chronic changes as detailed above.      CTA Head Neck with Contrast   Final Result   IMPRESSION:    HEAD CT:   No acute intracranial process.      HEAD CTA:   1.  No stenosis/occlusion, aneurysm, or high flow vascular malformation.   2.  Variant [place] of [place] anatomy as above.      NECK CTA:   No measurable stenosis or dissection.             PROCEDURES:   None      [facility] Documentation:   CMS Diagnoses:                 Telly Sow MD  [facility] EMERGENCY ROOM  [facility] [facility] [id number] [phone]       Telly Sow MD  03/08/24 0008

## 2024-03-08 NOTE — DISCHARGE INSTRUCTIONS
Please return to the emergency department if your symptoms worsen, if you develop numbness/weakness on one side of your body, difficulty with speech, changes in vision.

## 2024-03-08 NOTE — ED TRIAGE NOTES
Pt reports brain fog. He is in triage reporting he is having difficulty when driving because of dizziness. Denies blurred vision.      Triage Assessment (Adult)       Row Name 03/07/24 2128          Triage Assessment    Airway WDL WDL        Respiratory WDL    Respiratory WDL WDL        Skin Circulation/Temperature WDL    Skin Circulation/Temperature WDL WDL        Cardiac WDL    Cardiac WDL WDL        Peripheral/Neurovascular WDL    Peripheral Neurovascular WDL WDL        Cognitive/Neuro/Behavioral WDL    Cognitive/Neuro/Behavioral WDL WDL

## 2024-12-27 ENCOUNTER — APPOINTMENT (OUTPATIENT)
Dept: RADIOLOGY | Facility: CLINIC | Age: 64
End: 2024-12-27
Payer: MEDICARE

## 2024-12-27 ENCOUNTER — HOSPITAL ENCOUNTER (EMERGENCY)
Facility: CLINIC | Age: 64
Discharge: HOME | End: 2024-12-27
Payer: MEDICARE

## 2024-12-27 VITALS
RESPIRATION RATE: 19 BRPM | HEIGHT: 69 IN | DIASTOLIC BLOOD PRESSURE: 81 MMHG | HEART RATE: 79 BPM | OXYGEN SATURATION: 96 % | BODY MASS INDEX: 26.66 KG/M2 | TEMPERATURE: 98.1 F | WEIGHT: 180 LBS | SYSTOLIC BLOOD PRESSURE: 132 MMHG

## 2024-12-27 DIAGNOSIS — R05.9 COUGH, UNSPECIFIED TYPE: ICD-10-CM

## 2024-12-27 LAB
FLUAV RNA SPEC QL NAA+PROBE: NEGATIVE
FLUBV RNA RESP QL NAA+PROBE: NEGATIVE
RSV RNA SPEC NAA+PROBE: NEGATIVE
S PYO DNA THROAT QL NAA+PROBE: NOT DETECTED
SARS-COV-2 RNA RESP QL NAA+PROBE: NEGATIVE

## 2024-12-27 PROCEDURE — 87651 STREP A DNA AMP PROBE: CPT

## 2024-12-27 PROCEDURE — 71046 X-RAY EXAM CHEST 2 VIEWS: CPT

## 2024-12-27 PROCEDURE — 99284 EMERGENCY DEPT VISIT MOD MDM: CPT | Mod: 25

## 2024-12-27 PROCEDURE — 87637 SARSCOV2&INF A&B&RSV AMP PRB: CPT | Performed by: EMERGENCY MEDICINE

## 2024-12-27 ASSESSMENT — COLUMBIA-SUICIDE SEVERITY RATING SCALE - C-SSRS
2. HAVE YOU ACTUALLY HAD ANY THOUGHTS OF KILLING YOURSELF IN THE PAST MONTH?: NO
6. HAVE YOU EVER DONE ANYTHING, STARTED TO DO ANYTHING, OR PREPARED TO DO ANYTHING TO END YOUR LIFE?: NO
1. IN THE PAST MONTH, HAVE YOU WISHED YOU WERE DEAD OR WISHED YOU COULD GO TO SLEEP AND NOT WAKE UP?: NO

## 2024-12-27 NOTE — ED PROVIDER NOTES
EMERGENCY DEPARTMENT ENCOUNTER      NAME: Robina Ferguson  AGE: 64 year old male  YOB: 1960  MRN: [id number]  EVALUATION DATE & TIME: No admission date for patient encounter.    PCP: Efrain Pope    ED PROVIDER: Saba Pryor PA-C      Chief Complaint   Patient presents with    Cough     FINAL IMPRESSION:  1. Cough, unspecified type      ED COURSE & MEDICAL DECISION MAKING:    Pertinent Labs & Imaging studies reviewed. (See chart for details)  64 year old male presents to the Emergency Department for evaluation of cough.  Patient had 4 days of a cough and rhinorrhea.  No other symptoms.  No shortness of breath or chest pain.  Denies all other symptoms.  Takes Xarelto. Vitals reviewed and unremarkable.  Afebrile.  On exam patient alert and answering questions appropriately.  Normal rate.  Lungs are clear to auscultation bilaterally.  Abdomen is soft and nontender.  Posteropharynx is not erythematous.  No tonsillar exudate or edema.  Normal range of motion of neck.    Differential diagnosis includes COVID, influenza, RSV, viral illness, pneumonia.  Influenza, COVID and RSV was negative.  Strep is negative.  Chest x-ray shows no pleural effusions or pneumothorax.  No airspace disease or edema.  Normal size of the heart.  Patient is resting comfortably here in the emergency room.  Patient was educated on his results and will be discharged home.  Patient will follow-up with his primary care doctor discuss his ED visit.  Patient return to the ED if new symptoms develop or symptoms worsen.  Patient agrees with plan.  All questions answered.      ED COURSE:   10:50 AM I saw the patient.   12:43 PM patient was educated on his results and will be discharged home.  Patient agrees with plan.  All questions answered.       At the conclusion of the encounter I discussed the results of all of the tests and the disposition. The questions were answered. The patient or family acknowledged understanding and was  "agreeable with the care plan.     0 minutes of critical care time       Medical Decision Making  Obtained supplemental history:Supplemental history obtained?: Documented in chart  Reviewed external records: External records reviewed?: No  Care impacted by chronic illness:Documented in Chart  Care significantly affected by social determinants of health:N/A  Did you consider but not order tests?: In addition to work-up documented, I considered the following work up:   Did you interpret images independently?: Independent interpretation of ECG and images noted in documentation, when applicable.  Consultation discussion with other provider:Did you involve another provider (consultant, , pharmacy, etc.)?: No  Discharge. No recommendations on prescription strength medication(s). See documentation for any additional details.    Not Applicable    MEDICATIONS GIVEN IN THE EMERGENCY:  Medications - No data to display    NEW PRESCRIPTIONS STARTED AT TODAY'S ER VISIT  New Prescriptions    No medications on file       =================================================================    HPI    Patient information was obtained from: Patient    Use of : N/A     Robina Ferguson is a 64 year old male with a pertinent history of DVT who presents to this ED by private car for evaluation of a dry cough which occurred on 12/23/24 (~4 days ago). He endorses rhinorrhea and has been \"blowing [his] nose\" continuously for this. He denies pain medication use, fever, chills, sore throat, coughing with phlegm, chest pain, or shortness of breath. No other medical concerns are expressed at this time.     Takes Xarelto for a DVT that he had in 2021.  Denies any shortness of breath, chest pain or leg pain. No Leg swelling.    REVIEW OF SYSTEMS   As per HPI    PAST MEDICAL HISTORY:  Past Medical History:   Diagnosis Date    DVT (deep venous thrombosis) (H)        PAST SURGICAL HISTORY:  History reviewed. No pertinent surgical " "history.        CURRENT MEDICATIONS:    warfarin (COUMADIN) 5 MG tablet        ALLERGIES:  Allergies   Allergen Reactions    Penicillins Rash       FAMILY HISTORY:  History reviewed. No pertinent family history.    SOCIAL HISTORY:   Social History     Socioeconomic History    Marital status:    Tobacco Use    Smoking status: Former    Tobacco comments:     Quit at age 26 years.       VITALS:  /81   Pulse 79   Temp 98.1  F (36.7  C)   Resp 19   Ht 1.753 m (5' 9\")   Wt 81.6 kg (180 lb)   SpO2 96%   BMI 26.58 kg/m      PHYSICAL EXAM    Physical Exam  Vitals and nursing note reviewed.   Constitutional:       Appearance: Normal appearance.   HENT:      Head: Atraumatic.      Jaw: There is normal jaw occlusion. No trismus.      Right Ear: Hearing, tympanic membrane, ear canal and external ear normal.      Left Ear: Hearing, tympanic membrane, ear canal and external ear normal.      Nose: Nose normal.      Right Sinus: No maxillary sinus tenderness or frontal sinus tenderness.      Left Sinus: No maxillary sinus tenderness or frontal sinus tenderness.      Mouth/Throat:      Mouth: Mucous membranes are moist.      Tongue: No lesions. Tongue does not deviate from midline.      Palate: No mass and lesions.      Pharynx: Oropharynx is clear. Uvula midline.      Tonsils: No tonsillar exudate or tonsillar abscesses.   Eyes:      Conjunctiva/sclera: Conjunctivae normal.      Pupils: Pupils are equal, round, and reactive to light.   Neck:      Trachea: Trachea and phonation normal.   Cardiovascular:      Rate and Rhythm: Normal rate and regular rhythm.      Pulses: Normal pulses.      Heart sounds: Normal heart sounds. No murmur heard.     No friction rub. No gallop.   Pulmonary:      Effort: Pulmonary effort is normal.      Breath sounds: Normal breath sounds. No wheezing or rales.   Abdominal:      Tenderness: There is no abdominal tenderness. There is no guarding or rebound.   Musculoskeletal:      Cervical " back: Full passive range of motion without pain and normal range of motion.   Lymphadenopathy:      Cervical: No cervical adenopathy.   Skin:     General: Skin is dry.      Findings: No rash.   Neurological:      General: No focal deficit present.      Mental Status: He is alert and oriented to person, place, and time. Mental status is at baseline.   Psychiatric:         Mood and Affect: Mood normal.         Thought Content: Thought content normal.       LAB:  All pertinent labs reviewed and interpreted.  Labs Ordered and Resulted from Time of ED Arrival to Time of ED Departure   INFLUENZA A/B, RSV AND SARS-COV2 PCR - Normal       Result Value    Influenza A PCR Negative      Influenza B PCR Negative      RSV PCR Negative      SARS CoV2 PCR Negative     GROUP A STREPTOCOCCUS PCR THROAT SWAB - Normal    Group A strep by PCR Not Detected          RADIOLOGY:  Reviewed all pertinent imaging. Please see official radiology report.  Chest XR,  PA & LAT   Final Result   IMPRESSION: No pleural fluid or pneumothorax. No airspace disease or edema. Normal size of the heart.           I, Danohali Shaina, am serving as a scribe to document services personally performed by Saba Pryor PA-C, based on my observation and the provider's statements to me. I, Saba Pryor PA-C, attest that Liliana Merritt is acting in a scribe capacity, has observed my performance of the services and has documented them in accordance with my direction.    Saba Pryor PA-C  [facility] EMERGENCY ROOM  [address] [facility] [address] [id number] [phone]     Saba Pryor PA-C  01/01/25 8404

## 2024-12-27 NOTE — ED TRIAGE NOTES
Pt arrives with a cough for about 4 days. Wants to rule out flu     Triage Assessment (Adult)       Row Name 12/27/24 1127          Respiratory WDL    Respiratory WDL cough     Cough Frequency infrequent     Cough Type congested        Skin Circulation/Temperature WDL    Skin Circulation/Temperature WDL WDL        Cardiac WDL    Cardiac WDL WDL        Peripheral/Neurovascular WDL    Peripheral Neurovascular WDL WDL        Cognitive/Neuro/Behavioral WDL    Cognitive/Neuro/Behavioral WDL WDL

## 2024-12-27 NOTE — Clinical Note
Robina Ferguson was seen and treated in our emergency department on 12/27/2024.  He may return to work on 12/31/2024.       If you have any questions or concerns, please don't hesitate to call.      Saba Pryor, HARRIS

## 2024-12-27 NOTE — DISCHARGE INSTRUCTIONS
COVID, influenza,  RSV and strep was negative.  Chest x-ray was negative.  Follow-up with your primary care doctor discussed ED visit.  Return to the ED if new symptoms develop or symptoms worsen.